# Patient Record
Sex: MALE | HISPANIC OR LATINO | Employment: FULL TIME | URBAN - METROPOLITAN AREA
[De-identification: names, ages, dates, MRNs, and addresses within clinical notes are randomized per-mention and may not be internally consistent; named-entity substitution may affect disease eponyms.]

---

## 2024-05-20 ENCOUNTER — OFFICE VISIT (OUTPATIENT)
Dept: MEDICAL GROUP | Facility: MEDICAL CENTER | Age: 54
End: 2024-05-20
Payer: COMMERCIAL

## 2024-05-20 ENCOUNTER — HOSPITAL ENCOUNTER (OUTPATIENT)
Dept: RADIOLOGY | Facility: MEDICAL CENTER | Age: 54
End: 2024-05-20
Attending: FAMILY MEDICINE
Payer: COMMERCIAL

## 2024-05-20 ENCOUNTER — PHARMACY VISIT (OUTPATIENT)
Dept: PHARMACY | Facility: MEDICAL CENTER | Age: 54
End: 2024-05-20
Payer: COMMERCIAL

## 2024-05-20 VITALS
TEMPERATURE: 98.3 F | HEIGHT: 70 IN | OXYGEN SATURATION: 95 % | BODY MASS INDEX: 29.49 KG/M2 | WEIGHT: 206 LBS | SYSTOLIC BLOOD PRESSURE: 130 MMHG | DIASTOLIC BLOOD PRESSURE: 80 MMHG | HEART RATE: 107 BPM | RESPIRATION RATE: 16 BRPM

## 2024-05-20 DIAGNOSIS — Z11.59 NEED FOR HEPATITIS C SCREENING TEST: ICD-10-CM

## 2024-05-20 DIAGNOSIS — E78.5 HYPERLIPIDEMIA ASSOCIATED WITH TYPE 2 DIABETES MELLITUS (HCC): ICD-10-CM

## 2024-05-20 DIAGNOSIS — E11.69 HYPERLIPIDEMIA ASSOCIATED WITH TYPE 2 DIABETES MELLITUS (HCC): ICD-10-CM

## 2024-05-20 DIAGNOSIS — M79.641 RIGHT HAND PAIN: ICD-10-CM

## 2024-05-20 DIAGNOSIS — Z79.4 TYPE 2 DIABETES MELLITUS WITH HYPERGLYCEMIA, WITH LONG-TERM CURRENT USE OF INSULIN (HCC): ICD-10-CM

## 2024-05-20 DIAGNOSIS — Z23 NEED FOR VACCINATION: ICD-10-CM

## 2024-05-20 DIAGNOSIS — Z86.73 HISTORY OF STROKE: ICD-10-CM

## 2024-05-20 DIAGNOSIS — Z11.4 ENCOUNTER FOR SCREENING FOR HIV: ICD-10-CM

## 2024-05-20 DIAGNOSIS — Z12.5 PROSTATE CANCER SCREENING: ICD-10-CM

## 2024-05-20 DIAGNOSIS — E66.3 OVERWEIGHT (BMI 25.0-29.9): ICD-10-CM

## 2024-05-20 DIAGNOSIS — E11.65 TYPE 2 DIABETES MELLITUS WITH HYPERGLYCEMIA, WITH LONG-TERM CURRENT USE OF INSULIN (HCC): ICD-10-CM

## 2024-05-20 DIAGNOSIS — E11.59 HYPERTENSION ASSOCIATED WITH TYPE 2 DIABETES MELLITUS (HCC): ICD-10-CM

## 2024-05-20 DIAGNOSIS — Z12.11 COLON CANCER SCREENING: ICD-10-CM

## 2024-05-20 DIAGNOSIS — I15.2 HYPERTENSION ASSOCIATED WITH TYPE 2 DIABETES MELLITUS (HCC): ICD-10-CM

## 2024-05-20 PROCEDURE — 3075F SYST BP GE 130 - 139MM HG: CPT | Performed by: FAMILY MEDICINE

## 2024-05-20 PROCEDURE — 90746 HEPB VACCINE 3 DOSE ADULT IM: CPT | Performed by: FAMILY MEDICINE

## 2024-05-20 PROCEDURE — 99204 OFFICE O/P NEW MOD 45 MIN: CPT | Mod: 25 | Performed by: FAMILY MEDICINE

## 2024-05-20 PROCEDURE — 90471 IMMUNIZATION ADMIN: CPT | Performed by: FAMILY MEDICINE

## 2024-05-20 PROCEDURE — RXMED WILLOW AMBULATORY MEDICATION CHARGE: Performed by: FAMILY MEDICINE

## 2024-05-20 PROCEDURE — 3079F DIAST BP 80-89 MM HG: CPT | Performed by: FAMILY MEDICINE

## 2024-05-20 RX ORDER — EMPAGLIFLOZIN 25 MG/1
TABLET, FILM COATED ORAL
COMMUNITY
End: 2024-05-20 | Stop reason: SDUPTHER

## 2024-05-20 RX ORDER — FENOFIBRATE 145 MG/1
145 TABLET, COATED ORAL DAILY
COMMUNITY
End: 2024-05-20 | Stop reason: SDUPTHER

## 2024-05-20 RX ORDER — INSULIN GLARGINE 100 [IU]/ML
INJECTION, SOLUTION SUBCUTANEOUS
Qty: 45 ML | Refills: 3 | Status: SHIPPED | OUTPATIENT
Start: 2024-05-20

## 2024-05-20 RX ORDER — ATORVASTATIN CALCIUM 20 MG/1
20 TABLET, FILM COATED ORAL NIGHTLY
COMMUNITY
End: 2024-05-20 | Stop reason: SDUPTHER

## 2024-05-20 RX ORDER — SEMAGLUTIDE 1.34 MG/ML
INJECTION, SOLUTION SUBCUTANEOUS
COMMUNITY
End: 2024-05-20 | Stop reason: SDUPTHER

## 2024-05-20 RX ORDER — PEN NEEDLE, DIABETIC 31 GX5/16"
NEEDLE, DISPOSABLE MISCELLANEOUS
COMMUNITY
Start: 2024-03-25

## 2024-05-20 RX ORDER — EMPAGLIFLOZIN 25 MG/1
25 TABLET, FILM COATED ORAL EVERY MORNING
Qty: 90 TABLET | Refills: 3 | Status: SHIPPED | OUTPATIENT
Start: 2024-05-20

## 2024-05-20 RX ORDER — CLOPIDOGREL BISULFATE 75 MG/1
75 TABLET ORAL DAILY
COMMUNITY
End: 2024-05-20 | Stop reason: SDUPTHER

## 2024-05-20 RX ORDER — AMLODIPINE BESYLATE 5 MG/1
10 TABLET ORAL DAILY
Qty: 30 TABLET | Status: CANCELLED | OUTPATIENT
Start: 2024-05-20

## 2024-05-20 RX ORDER — ATORVASTATIN CALCIUM 20 MG/1
20 TABLET, FILM COATED ORAL NIGHTLY
Qty: 30 TABLET | Status: CANCELLED | OUTPATIENT
Start: 2024-05-20

## 2024-05-20 RX ORDER — FENOFIBRATE 145 MG/1
145 TABLET, COATED ORAL EVERY MORNING
Qty: 90 TABLET | Refills: 3 | Status: SHIPPED | OUTPATIENT
Start: 2024-05-20

## 2024-05-20 RX ORDER — ATORVASTATIN CALCIUM 20 MG/1
20 TABLET, FILM COATED ORAL NIGHTLY
Qty: 90 TABLET | Refills: 3 | Status: SHIPPED | OUTPATIENT
Start: 2024-05-20

## 2024-05-20 RX ORDER — LISINOPRIL 40 MG/1
40 TABLET ORAL
Qty: 90 TABLET | Refills: 3 | Status: SHIPPED | OUTPATIENT
Start: 2024-05-20

## 2024-05-20 RX ORDER — INSULIN GLARGINE 100 [IU]/ML
INJECTION, SOLUTION SUBCUTANEOUS
COMMUNITY
Start: 2024-03-25 | End: 2024-05-20 | Stop reason: SDUPTHER

## 2024-05-20 RX ORDER — LISINOPRIL 20 MG/1
60 TABLET ORAL DAILY
COMMUNITY
End: 2024-05-20 | Stop reason: SDUPTHER

## 2024-05-20 RX ORDER — AMLODIPINE BESYLATE 5 MG/1
10 TABLET ORAL DAILY
COMMUNITY
End: 2024-05-20

## 2024-05-20 RX ORDER — AMLODIPINE BESYLATE 10 MG/1
10 TABLET ORAL
Qty: 90 TABLET | Refills: 3 | Status: SHIPPED | OUTPATIENT
Start: 2024-05-20

## 2024-05-20 RX ORDER — CLOPIDOGREL BISULFATE 75 MG/1
75 TABLET ORAL DAILY
Qty: 90 TABLET | Refills: 3 | Status: SHIPPED | OUTPATIENT
Start: 2024-05-20

## 2024-05-20 RX ORDER — SEMAGLUTIDE 1.34 MG/ML
1 INJECTION, SOLUTION SUBCUTANEOUS
Qty: 9 ML | Refills: 3 | Status: SHIPPED | OUTPATIENT
Start: 2024-05-20

## 2024-05-20 ASSESSMENT — PATIENT HEALTH QUESTIONNAIRE - PHQ9: CLINICAL INTERPRETATION OF PHQ2 SCORE: 0

## 2024-05-20 NOTE — LETTER
[employer]  Luis Antonio Cazares D.O.  [address]  [place] [address]  Fax: [phone]   Authorization for Release/Disclosure of   Protected Health Information   Name: JS CRUZ : 1970 SSN: [id number]   Address: [address] [place] [address]  [place] [address] Phone:    [phone] (home)    I authorize the entity listed below to release/disclose the PHI below to:   [employer]/Luis Antonio Cazares D.O. and Luis Antonio Cazares D.O.   Provider or Entity Name:  [facility]   Address   City, [facility], [facility]   Phone:  [phone]    Fax:  [phone]   Reason for request: continuity of care   Information to be released:    [  ] LAST COLONOSCOPY,  including any PATH REPORT and follow-up  [  ] LAST FIT/COLOGUARD RESULT [  ] LAST DEXA  [  ] LAST MAMMOGRAM  [  ] LAST PAP  [  ] LAST LABS [xxxxxxx  ] RETINA EXAM REPORT  [  ] IMMUNIZATION RECORDS  [  ] Release all info      [  ] Check here and initial the line next to each item to release ALL health information INCLUDING  _____ Care and treatment for drug and / or alcohol abuse  _____ HIV testing, infection status, or AIDS  _____ Genetic Testing    DATES OF SERVICE OR TIME PERIOD TO BE DISCLOSED: _____________  I understand and acknowledge that:  * This Authorization may be revoked at any time by you in writing, except if your health information has already been used or disclosed.  * Your health information that will be used or disclosed as a result of you signing this authorization could be re-disclosed by the recipient. If this occurs, your re-disclosed health information may no longer be protected by State or Federal laws.  * You may refuse to sign this Authorization. Your refusal will not affect your ability to obtain treatment.  * This Authorization becomes effective upon signing and will  on (date) __________.      If no date is indicated, this Authorization will  one (1) year from the signature date.    Name: Js HOLCOMB  Apolinar  Signature: Date:   5/20/2024     PLEASE FAX REQUESTED RECORDS BACK TO: [phone]

## 2024-05-20 NOTE — PROGRESS NOTES
Verbal consent was acquired by the patient to use [employer] ambient listening note generation during this visit    Subjective:     CC:  Diagnoses of Right hand pain, Type 2 diabetes mellitus with hyperglycemia, with long-term current use of insulin (HCC), Hypertension associated with type 2 diabetes mellitus (HCC), Hyperlipidemia associated with type 2 diabetes mellitus (HCC), History of stroke, Overweight (BMI 25.0-29.9), Need for vaccination, Colon cancer screening, Prostate cancer screening, Need for hepatitis C screening test, and Encounter for screening for HIV were pertinent to this visit.    HISTORY OF THE PRESENT ILLNESS: Patient is a 54 y.o. male. This pleasant patient is here today to establish care and discuss diabetes, right hand pain.  He was previously with [facility].    History of Present Illness  The patient is a 54-year-old male here to establish care and get our medications refilled among many things.    The patient has a history of multiple strokes, with the first occurring in 2016. Following the stroke, he was hospitalized for a duration of 3 years. In either 12/2023 or 01/2024, while engaged in heavy-duty work, he sustained a wound on his right hand. Around 02/2024 or 03/2024, he began to experience abnormal hand function, prompting him to learn hand practice. He reports pain, stiffness, and a sensation of his pinky finger popping open. He also experiences tingling and numbness, and a lack of hand control, necessitating the use of a glove.    The patient's blood glucose levels have been well-managed with Ozempic, although he does not frequently monitor his weight. He utilizes 34 units of Lantus in the morning and 40 units at night. He has not been able to monitor his blood glucose levels due to difficulties with his finger pricks. His last eye examination was approximately 2 months ago, during which he was informed that his left eye was not significantly affected.    The  patient has never undergone a colonoscopy or stool test. He was referred to an ophthalmologist at [facility], but has yet to schedule an appointment. He reports difficulty with distance vision and requests a referral to a new ophthalmologist.    Supplemental Information  About 18 years ago, he was in the hospital because of too much drinking and that is why he became a diabetic. He lost his eyes for about 2 weeks of it until he got his glasses again.   He has not smoked since 2016. He started smoking back and forth since he was about 15 or 16 years old. He used to smoke a pack a day for a whole week. He tried to chew tobacco when he was in a foster home. He does not drink alcohol. He smokes marijuana to help him sleep. He is not sexually active.   He has a family history of stroke.    Current Outpatient Medications Ordered in Epic   Medication Sig Dispense Refill    TECHLITE PEN NEEDLES       Continuous Glucose  (FREESTYLE DARVIN 2 READER) Device       clopidogrel (PLAVIX) 75 MG Tab Take 1 Tablet by mouth every day. 90 Tablet 3    Empagliflozin (JARDIANCE) 25 MG Tab Take 1 tablet (25 mg) by mouth every morning. 90 Tablet 3    fenofibrate (TRICOR) 145 MG Tab Take 1 Tablet by mouth every morning. 90 Tablet 3    LANTUS SOLOSTAR 100 UNIT/ML Solution Pen-injector injection Take 34 units in the morning and 40 units in the evening, subcutaneously 45 mL 3    lisinopril (PRINIVIL) 40 MG tablet Take 1 Tablet by mouth at bedtime. 90 Tablet 3    metformin (GLUCOPHAGE) 1000 MG tablet Take 1 Tablet by mouth 2 times a day with meals. 180 Tablet 3    metoprolol tartrate (LOPRESSOR) 25 MG Tab Take 1 Tablet by mouth 2 times a day. 180 Tablet 3    Semaglutide, 1 MG/DOSE, (OZEMPIC, 1 MG/DOSE,) 4 MG/3ML Solution Pen-injector Inject 1 mg under the skin every 7 days. 9 mL 3    amLODIPine (NORVASC) 10 MG Tab Take 1 Tablet by mouth at bedtime. 90 Tablet 3    atorvastatin (LIPITOR) 20 MG Tab Take 1 Tablet by mouth every  "evening. 90 Tablet 3     No current Epic-ordered facility-administered medications on file.       Not on File    History reviewed. No pertinent past medical history.    Past Surgical History:   Procedure Laterality Date    ENDOSCOPY         Family History   Problem Relation Age of Onset    Heart Disease Other     Stroke Other        Social History     Socioeconomic History    Marital status:    Tobacco Use    Smoking status: Former     Current packs/day: 0.00     Average packs/day: 0.1 packs/day for 31.0 years (3.1 ttl pk-yrs)     Types: Cigarettes     Start date:      Quit date: 2016     Years since quittin.3    Smokeless tobacco: Former     Types: Chew   Vaping Use    Vaping status: Never Used   Substance and Sexual Activity    Alcohol use: Not Currently    Drug use: Yes     Types: Marijuana     Comment: daily    Sexual activity: Not Currently       Health Maintenance: Completed    ROS:   ROS see HPI      Objective:       Exam: /80   Pulse (!) 107   Temp 36.8 °C (98.3 °F) (Temporal)   Resp 16   Ht 1.778 m (5' 10\")   Wt 93.4 kg (206 lb)   SpO2 95%  Body mass index is 29.56 kg/m².    Physical Exam  Vitals reviewed.   Constitutional:       General: He is not in acute distress.     Appearance: Normal appearance.   HENT:      Head: Normocephalic and atraumatic.   Cardiovascular:      Rate and Rhythm: Normal rate and regular rhythm.      Heart sounds: Normal heart sounds.   Pulmonary:      Effort: Pulmonary effort is normal. No respiratory distress.      Breath sounds: Normal breath sounds.   Musculoskeletal:         General: No swelling, tenderness or deformity. Normal range of motion.   Skin:     General: Skin is warm and dry.   Neurological:      Mental Status: He is alert. Mental status is at baseline.      Gait: Gait normal.   Psychiatric:         Mood and Affect: Mood normal.         Behavior: Behavior normal.             Assessment & Plan:   54 y.o. male with the following -    1. " Right hand pain  Newer problem ongoing for the last few months.  Patient reports he had a superficial wound that for some reason he put lemon juice in it.  The skin has healed.  He continues to have pain and a strange feeling in the ulnar portion of his palmar hand and into the fourth and fifth digits.  I do not know if this is related to stated history.  There does seem to be some mild atrophy of the hypothenar eminence.  No discoloration or other deformity.  Will get a right hand x-ray and see if we are more time to discuss at next appointment.  - DX-HAND 3+ RIGHT; Future    2. Type 2 diabetes mellitus with hyperglycemia, with long-term current use of insulin (HCC)  Chronic uncertain on stability as patient does not have a sugar log or recent labs for me to assess.  He denies hypoglycemia.  It seems that his regimen is fairly rigorously and he does seem to know it well.  Will continue with medications as listed below.  - Empagliflozin (JARDIANCE) 25 MG Tab; Take 1 tablet (25 mg) by mouth every morning.  Dispense: 90 Tablet; Refill: 3  - LANTUS SOLOSTAR 100 UNIT/ML Solution Pen-injector injection; Take 34 units in the morning and 40 units in the evening, subcutaneously  Dispense: 45 mL; Refill: 3  - metformin (GLUCOPHAGE) 1000 MG tablet; Take 1 Tablet by mouth 2 times a day with meals.  Dispense: 180 Tablet; Refill: 3  - metoprolol tartrate (LOPRESSOR) 25 MG Tab; Take 1 Tablet by mouth 2 times a day.  Dispense: 180 Tablet; Refill: 3  - Semaglutide, 1 MG/DOSE, (OZEMPIC, 1 MG/DOSE,) 4 MG/3ML Solution Pen-injector; Inject 1 mg under the skin every 7 days.  Dispense: 9 mL; Refill: 3  - atorvastatin (LIPITOR) 20 MG Tab; Take 1 Tablet by mouth every evening.  Dispense: 90 Tablet; Refill: 3  - CBC WITHOUT DIFFERENTIAL; Future  - Comp Metabolic Panel; Future  - Lipid Profile; Future  - MICROALBUMIN CREAT RATIO URINE; Future  - VITAMIN B12; Future  - TSH WITH REFLEX TO FT4; Future    3. Hypertension associated with type 2  diabetes mellitus (HCC)  This is a chronic condition, controlled.  Blood pressure is at goal under 140/90 in the office today.  We will continue the current regimen.  - lisinopril (PRINIVIL) 40 MG tablet; Take 1 Tablet by mouth at bedtime.  Dispense: 90 Tablet; Refill: 3  - metoprolol tartrate (LOPRESSOR) 25 MG Tab; Take 1 Tablet by mouth 2 times a day.  Dispense: 180 Tablet; Refill: 3  - amLODIPine (NORVASC) 10 MG Tab; Take 1 Tablet by mouth at bedtime.  Dispense: 90 Tablet; Refill: 3  - Comp Metabolic Panel; Future  - MICROALBUMIN CREAT RATIO URINE; Future    4. Hyperlipidemia associated with type 2 diabetes mellitus (HCC)  Chronic and presumed stable.  Patient does have a history of stroke.  Will continue on Plavix, Tricor, Lipitor as listed.  - clopidogrel (PLAVIX) 75 MG Tab; Take 1 Tablet by mouth every day.  Dispense: 90 Tablet; Refill: 3  - fenofibrate (TRICOR) 145 MG Tab; Take 1 Tablet by mouth every morning.  Dispense: 90 Tablet; Refill: 3  - atorvastatin (LIPITOR) 20 MG Tab; Take 1 Tablet by mouth every evening.  Dispense: 90 Tablet; Refill: 3  - Comp Metabolic Panel; Future  - Lipid Profile; Future    5. History of stroke  - clopidogrel (PLAVIX) 75 MG Tab; Take 1 Tablet by mouth every day.  Dispense: 90 Tablet; Refill: 3  - atorvastatin (LIPITOR) 20 MG Tab; Take 1 Tablet by mouth every evening.  Dispense: 90 Tablet; Refill: 3  - CBC WITHOUT DIFFERENTIAL; Future  - Comp Metabolic Panel; Future  - Lipid Profile; Future    6. Overweight (BMI 25.0-29.9)  - CBC WITHOUT DIFFERENTIAL; Future  - Comp Metabolic Panel; Future  - Lipid Profile; Future  - TSH WITH REFLEX TO FT4; Future    7. Need for vaccination  - Hepatitis B Vaccine Adult 20+    8. Colon cancer screening  - Referral to GI for Colonoscopy    9. Prostate cancer screening  - PROSTATE SPECIFIC AG SCREENING; Future    10. Need for hepatitis C screening test  - HEP C VIRUS ANTIBODY; Future    11. Encounter for screening for HIV  - HIV AG/AB COMBO ASSAY  SCREENING; Future        Return in about 4 weeks (around 6/17/2024), or if symptoms worsen or fail to improve, for Diabetes F/U, Medication F/U.    Please note that this dictation was created using voice recognition software. I have made every reasonable attempt to correct obvious errors, but I expect that there are errors of grammar and possibly content that I did not discover before finalizing the note.

## 2024-05-22 ENCOUNTER — TELEPHONE (OUTPATIENT)
Dept: MEDICAL GROUP | Facility: MEDICAL CENTER | Age: 54
End: 2024-05-22
Payer: COMMERCIAL

## 2024-05-22 DIAGNOSIS — E11.65 TYPE 2 DIABETES MELLITUS WITH HYPERGLYCEMIA, WITH LONG-TERM CURRENT USE OF INSULIN (HCC): Chronic | ICD-10-CM

## 2024-05-22 DIAGNOSIS — Z79.4 TYPE 2 DIABETES MELLITUS WITH HYPERGLYCEMIA, WITH LONG-TERM CURRENT USE OF INSULIN (HCC): Chronic | ICD-10-CM

## 2024-05-24 ENCOUNTER — HOSPITAL ENCOUNTER (OUTPATIENT)
Dept: LAB | Facility: MEDICAL CENTER | Age: 54
End: 2024-05-24
Attending: FAMILY MEDICINE
Payer: COMMERCIAL

## 2024-05-24 DIAGNOSIS — E11.59 HYPERTENSION ASSOCIATED WITH TYPE 2 DIABETES MELLITUS (HCC): ICD-10-CM

## 2024-05-24 DIAGNOSIS — E78.5 HYPERLIPIDEMIA ASSOCIATED WITH TYPE 2 DIABETES MELLITUS (HCC): ICD-10-CM

## 2024-05-24 DIAGNOSIS — Z12.5 PROSTATE CANCER SCREENING: ICD-10-CM

## 2024-05-24 DIAGNOSIS — Z86.73 HISTORY OF STROKE: ICD-10-CM

## 2024-05-24 DIAGNOSIS — I15.2 HYPERTENSION ASSOCIATED WITH TYPE 2 DIABETES MELLITUS (HCC): ICD-10-CM

## 2024-05-24 DIAGNOSIS — E11.69 HYPERLIPIDEMIA ASSOCIATED WITH TYPE 2 DIABETES MELLITUS (HCC): ICD-10-CM

## 2024-05-24 DIAGNOSIS — Z11.59 NEED FOR HEPATITIS C SCREENING TEST: ICD-10-CM

## 2024-05-24 DIAGNOSIS — E11.65 TYPE 2 DIABETES MELLITUS WITH HYPERGLYCEMIA, WITH LONG-TERM CURRENT USE OF INSULIN (HCC): Chronic | ICD-10-CM

## 2024-05-24 DIAGNOSIS — E66.3 OVERWEIGHT (BMI 25.0-29.9): ICD-10-CM

## 2024-05-24 DIAGNOSIS — Z11.4 ENCOUNTER FOR SCREENING FOR HIV: ICD-10-CM

## 2024-05-24 DIAGNOSIS — Z79.4 TYPE 2 DIABETES MELLITUS WITH HYPERGLYCEMIA, WITH LONG-TERM CURRENT USE OF INSULIN (HCC): Chronic | ICD-10-CM

## 2024-05-24 LAB
ALBUMIN SERPL BCP-MCNC: 4 G/DL (ref 3.2–4.9)
ALBUMIN/GLOB SERPL: 1.2 G/DL
ALP SERPL-CCNC: 99 U/L (ref 30–99)
ALT SERPL-CCNC: 29 U/L (ref 2–50)
ANION GAP SERPL CALC-SCNC: 14 MMOL/L (ref 7–16)
AST SERPL-CCNC: 22 U/L (ref 12–45)
BILIRUB SERPL-MCNC: 0.6 MG/DL (ref 0.1–1.5)
BUN SERPL-MCNC: 18 MG/DL (ref 8–22)
CALCIUM ALBUM COR SERPL-MCNC: 9.3 MG/DL (ref 8.5–10.5)
CALCIUM SERPL-MCNC: 9.3 MG/DL (ref 8.5–10.5)
CHLORIDE SERPL-SCNC: 101 MMOL/L (ref 96–112)
CHOLEST SERPL-MCNC: 437 MG/DL (ref 100–199)
CO2 SERPL-SCNC: 22 MMOL/L (ref 20–33)
CREAT SERPL-MCNC: 0.85 MG/DL (ref 0.5–1.4)
ERYTHROCYTE [DISTWIDTH] IN BLOOD BY AUTOMATED COUNT: 38.4 FL (ref 35.9–50)
EST. AVERAGE GLUCOSE BLD GHB EST-MCNC: 269 MG/DL
GFR SERPLBLD CREATININE-BSD FMLA CKD-EPI: 104 ML/MIN/1.73 M 2
GLOBULIN SER CALC-MCNC: 3.3 G/DL (ref 1.9–3.5)
GLUCOSE SERPL-MCNC: 136 MG/DL (ref 65–99)
HBA1C MFR BLD: 11 % (ref 4–5.6)
HCT VFR BLD AUTO: 47.6 % (ref 42–52)
HCV AB SER QL: REACTIVE
HDLC SERPL-MCNC: 33 MG/DL
HGB BLD-MCNC: 17 G/DL (ref 14–18)
HIV 1+2 AB+HIV1 P24 AG SERPL QL IA: NORMAL
LDLC SERPL CALC-MCNC: ABNORMAL MG/DL
MCH RBC QN AUTO: 30.2 PG (ref 27–33)
MCHC RBC AUTO-ENTMCNC: 35.7 G/DL (ref 32.3–36.5)
MCV RBC AUTO: 84.7 FL (ref 81.4–97.8)
PLATELET # BLD AUTO: 249 K/UL (ref 164–446)
PMV BLD AUTO: 9.5 FL (ref 9–12.9)
POTASSIUM SERPL-SCNC: 3.9 MMOL/L (ref 3.6–5.5)
PROT SERPL-MCNC: 7.3 G/DL (ref 6–8.2)
PSA SERPL-MCNC: 0.26 NG/ML (ref 0–4)
RBC # BLD AUTO: 5.62 M/UL (ref 4.7–6.1)
SODIUM SERPL-SCNC: 137 MMOL/L (ref 135–145)
TRIGL SERPL-MCNC: 802 MG/DL (ref 0–149)
TSH SERPL DL<=0.005 MIU/L-ACNC: 1.22 UIU/ML (ref 0.38–5.33)
VIT B12 SERPL-MCNC: 626 PG/ML (ref 211–911)
WBC # BLD AUTO: 8.5 K/UL (ref 4.8–10.8)

## 2024-05-25 LAB
CREAT UR-MCNC: 178.12 MG/DL
MICROALBUMIN UR-MCNC: 3.4 MG/DL
MICROALBUMIN/CREAT UR: 19 MG/G (ref 0–30)

## 2024-05-28 LAB
HCV RNA SERPL NAA+PROBE-ACNC: NOT DETECTED IU/ML
HCV RNA SERPL NAA+PROBE-LOG IU: NOT DETECTED LOG IU/ML
HCV RNA SERPL QL NAA+PROBE: NOT DETECTED

## 2024-06-04 ENCOUNTER — TELEPHONE (OUTPATIENT)
Dept: MEDICAL GROUP | Facility: MEDICAL CENTER | Age: 54
End: 2024-06-04
Payer: COMMERCIAL

## 2024-06-04 NOTE — TELEPHONE ENCOUNTER
FINAL PRIOR AUTHORIZATION STATUS:    1.  Name of Medication & Dose: Semaglutide, 1 MG/DOSE, (OZEMPIC, 1 MG/DOSE,) 4 MG/3ML Solution Pen-injector      2. Prior Auth Status: Approved through til 6/3/25     3. Action Taken: Pharmacy Notified: yes Patient Notified: yes

## 2024-06-18 ENCOUNTER — OFFICE VISIT (OUTPATIENT)
Dept: MEDICAL GROUP | Facility: MEDICAL CENTER | Age: 54
End: 2024-06-18
Payer: COMMERCIAL

## 2024-06-18 ENCOUNTER — PHARMACY VISIT (OUTPATIENT)
Dept: PHARMACY | Facility: MEDICAL CENTER | Age: 54
End: 2024-06-18
Payer: COMMERCIAL

## 2024-06-18 VITALS
WEIGHT: 211 LBS | DIASTOLIC BLOOD PRESSURE: 66 MMHG | SYSTOLIC BLOOD PRESSURE: 126 MMHG | BODY MASS INDEX: 30.21 KG/M2 | OXYGEN SATURATION: 95 % | HEIGHT: 70 IN | TEMPERATURE: 97 F | HEART RATE: 110 BPM

## 2024-06-18 DIAGNOSIS — Z86.73 HISTORY OF STROKE: ICD-10-CM

## 2024-06-18 DIAGNOSIS — I15.2 HYPERTENSION ASSOCIATED WITH TYPE 2 DIABETES MELLITUS (HCC): Chronic | ICD-10-CM

## 2024-06-18 DIAGNOSIS — E11.69 HYPERLIPIDEMIA ASSOCIATED WITH TYPE 2 DIABETES MELLITUS (HCC): ICD-10-CM

## 2024-06-18 DIAGNOSIS — Z23 NEED FOR VACCINATION: ICD-10-CM

## 2024-06-18 DIAGNOSIS — E66.09 CLASS 1 OBESITY DUE TO EXCESS CALORIES WITH SERIOUS COMORBIDITY AND BODY MASS INDEX (BMI) OF 30.0 TO 30.9 IN ADULT: ICD-10-CM

## 2024-06-18 DIAGNOSIS — E78.5 HYPERLIPIDEMIA ASSOCIATED WITH TYPE 2 DIABETES MELLITUS (HCC): ICD-10-CM

## 2024-06-18 DIAGNOSIS — E11.65 UNCONTROLLED TYPE 2 DIABETES MELLITUS WITH HYPERGLYCEMIA (HCC): ICD-10-CM

## 2024-06-18 DIAGNOSIS — E11.59 HYPERTENSION ASSOCIATED WITH TYPE 2 DIABETES MELLITUS (HCC): Chronic | ICD-10-CM

## 2024-06-18 PROBLEM — E66.811 CLASS 1 OBESITY DUE TO EXCESS CALORIES WITH SERIOUS COMORBIDITY AND BODY MASS INDEX (BMI) OF 30.0 TO 30.9 IN ADULT: Status: ACTIVE | Noted: 2024-05-20

## 2024-06-18 PROBLEM — Z91.199 NONCOMPLIANCE: Status: ACTIVE | Noted: 2024-06-18

## 2024-06-18 PROBLEM — I69.319 RESIDUAL COGNITIVE DEFICIT AS LATE EFFECT OF CEREBROVASCULAR ACCIDENT: Status: ACTIVE | Noted: 2024-06-18

## 2024-06-18 PROBLEM — Z87.19 HISTORY OF PANCREATITIS: Status: RESOLVED | Noted: 2024-06-18 | Resolved: 2024-06-18

## 2024-06-18 LAB — GLUCOSE BLD-MCNC: 365 MG/DL (ref 65–99)

## 2024-06-18 PROCEDURE — 3078F DIAST BP <80 MM HG: CPT | Performed by: FAMILY MEDICINE

## 2024-06-18 PROCEDURE — 90472 IMMUNIZATION ADMIN EACH ADD: CPT | Performed by: FAMILY MEDICINE

## 2024-06-18 PROCEDURE — 3074F SYST BP LT 130 MM HG: CPT | Performed by: FAMILY MEDICINE

## 2024-06-18 PROCEDURE — 90746 HEPB VACCINE 3 DOSE ADULT IM: CPT | Performed by: FAMILY MEDICINE

## 2024-06-18 PROCEDURE — 82962 GLUCOSE BLOOD TEST: CPT | Performed by: FAMILY MEDICINE

## 2024-06-18 PROCEDURE — 99214 OFFICE O/P EST MOD 30 MIN: CPT | Mod: 25 | Performed by: FAMILY MEDICINE

## 2024-06-18 PROCEDURE — 90677 PCV20 VACCINE IM: CPT | Performed by: FAMILY MEDICINE

## 2024-06-18 PROCEDURE — 90471 IMMUNIZATION ADMIN: CPT | Performed by: FAMILY MEDICINE

## 2024-06-18 PROCEDURE — RXMED WILLOW AMBULATORY MEDICATION CHARGE: Performed by: FAMILY MEDICINE

## 2024-06-18 RX ORDER — FENOFIBRATE 145 MG/1
TABLET, COATED ORAL
COMMUNITY
End: 2024-06-18

## 2024-06-18 RX ORDER — ATORVASTATIN CALCIUM 40 MG/1
40 TABLET, FILM COATED ORAL NIGHTLY
Qty: 30 TABLET | Refills: 11 | Status: SHIPPED | OUTPATIENT
Start: 2024-06-18 | End: 2024-06-18 | Stop reason: SDUPTHER

## 2024-06-18 RX ORDER — INSULIN GLARGINE 100 [IU]/ML
INJECTION, SOLUTION SUBCUTANEOUS
Qty: 45 ML | Refills: 3 | Status: SHIPPED | OUTPATIENT
Start: 2024-06-18

## 2024-06-18 RX ORDER — ATORVASTATIN CALCIUM 40 MG/1
40 TABLET, FILM COATED ORAL NIGHTLY
Qty: 90 TABLET | Refills: 3 | Status: SHIPPED | OUTPATIENT
Start: 2024-06-18

## 2024-06-18 RX ORDER — AMLODIPINE BESYLATE 5 MG/1
TABLET ORAL
COMMUNITY
End: 2024-06-18

## 2024-06-18 ASSESSMENT — FIBROSIS 4 INDEX: FIB4 SCORE: 0.87

## 2024-06-18 NOTE — PATIENT INSTRUCTIONS
Diabetes  Continue Jardiance (empagliflozin) 25 mg every morning  Metfromin continue 1,000 mg twice a day with food  Increase Semaglutide (ozempic) to 2 mg injection once a week  Restart Lantus Insulin 20 units nightly    Cholesterol  Increase Atorvastatin to 40 mg nightly  Continue Fenofibrate 145 mg daily    Blood pressure  Continue Lisinopril 40 mg and Amlodipine 10 mg nightly  Continue Metoprolol 25 mg twice a day

## 2024-06-18 NOTE — LETTER
[facility]  Luis Antonio Cazares D.O.  [address]  [place] [address]  Fax: [phone]   Authorization for Release/Disclosure of   Protected Health Information   Name: JS CRUZ : 1970 SSN: [id number]   Address: [address] [place] [address]  [place] [address] Phone:    [phone] (home)    I authorize the entity listed below to release/disclose the PHI below to:   [facility]/Luis Antonio Cazares D.O. and Luis Antonio Cazares D.O.   Provider or Entity Name:  [facility] 15   Address   City, State, [facility]   Phone:  [phone]    Fax:  [phone]   Reason for request: continuity of care   Information to be released:    [  ] LAST COLONOSCOPY,  including any PATH REPORT and follow-up  [  ] LAST FIT/COLOGUARD RESULT [  ] LAST DEXA  [  ] LAST MAMMOGRAM  [  ] LAST PAP  [  ] LAST LABS [xxxxxx  ] RETINA EXAM REPORT  [  ] IMMUNIZATION RECORDS  [  ] Release all info      [  ] Check here and initial the line next to each item to release ALL health information INCLUDING  _____ Care and treatment for drug and / or alcohol abuse  _____ HIV testing, infection status, or AIDS  _____ Genetic Testing    DATES OF SERVICE OR TIME PERIOD TO BE DISCLOSED: _____________  I understand and acknowledge that:  * This Authorization may be revoked at any time by you in writing, except if your health information has already been used or disclosed.  * Your health information that will be used or disclosed as a result of you signing this authorization could be re-disclosed by the recipient. If this occurs, your re-disclosed health information may no longer be protected by State or Federal laws.  * You may refuse to sign this Authorization. Your refusal will not affect your ability to obtain treatment.  * This Authorization becomes effective upon signing and will  on (date) __________.      If no date is indicated, this Authorization will  one (1) year from the signature date.    Name: Js  Zan Jiang  Signature: Date:   6/18/2024     PLEASE FAX REQUESTED RECORDS BACK TO: [phone]

## 2024-06-18 NOTE — PROGRESS NOTES
"Verbal consent was acquired by the patient to use [employer] ambient listening note generation during this visit    Subjective:     CC: \"Diabetes management\"    History of Present Illness  The patient presents to the office for follow-up of multiple medical concerns.    The patient's dietary habits include nightly consumption of fish, green potatoes, brussels sprouts, oatmeal, milk, and coffee with sweetened cream. He has been adhering to a weekly regimen of Ozempic, which he tolerates well. However, he has discontinued the use of Lantus insulin for approximately one week. His current medication regimen includes Jardiance 25 mg in the morning, metformin 1000 mg twice daily, and amlodipine 10 mg at bedtime for blood pressure management.          Objective:     Exam:  /66   Pulse (!) 110   Temp 36.1 °C (97 °F) (Temporal)   Ht 1.778 m (5' 10\")   Wt 95.7 kg (211 lb)   SpO2 95%   BMI 30.28 kg/m²  Body mass index is 30.28 kg/m².    Physical Exam  Vitals reviewed.   Constitutional:       General: He is not in acute distress.     Appearance: Normal appearance. He is obese. He is not ill-appearing or toxic-appearing.   HENT:      Head: Normocephalic and atraumatic.   Cardiovascular:      Rate and Rhythm: Regular rhythm. Tachycardia present.      Heart sounds: Normal heart sounds.   Pulmonary:      Effort: Pulmonary effort is normal. No respiratory distress.      Breath sounds: Normal breath sounds.   Skin:     General: Skin is warm and dry.   Neurological:      Mental Status: He is alert. Mental status is at baseline.      Gait: Gait normal.   Psychiatric:         Mood and Affect: Mood normal.         Behavior: Behavior normal.             Results  Laboratory Studies  Hepatitis C antibody was positive, no detectable virus detected. Hemoglobin A1c was 11.0. Blood sugar was 365. TSH was 1.2. Vitamin B12 was 626. HIV was negative. Microalbumin was 19. PSA was 0.26. Fasting sugar was 136. Creatinine was 0.85. Liver " enzymes were normal. Hemoglobin was 17.    Imaging  X-ray of the right hand showed no injury, bones appeared normal.      Assessment & Plan:       1. Uncontrolled type 2 diabetes mellitus with hyperglycemia (HCC)  - Diabetic Monofilament LE Exam  - POCT Glucose  - Semaglutide, 2 MG/DOSE, 8 MG/3ML Solution Pen-injector; Inject 2 mg under the skin every 7 days.  Dispense: 3 mL; Refill: 11  - Referral to Pharmacotherapy Service  - LANTUS SOLOSTAR 100 UNIT/ML Solution Pen-injector injection; Take 20 units in the evening, subcutaneously  Dispense: 45 mL; Refill: 3    2. Hypertension associated with type 2 diabetes mellitus (HCC)    3. Hyperlipidemia associated with type 2 diabetes mellitus (HCC)  - atorvastatin (LIPITOR) 40 MG Tab; Take 1 Tablet by mouth every evening.  Dispense: 90 Tablet; Refill: 3    4. History of stroke  - atorvastatin (LIPITOR) 40 MG Tab; Take 1 Tablet by mouth every evening.  Dispense: 90 Tablet; Refill: 3    5. Class 1 obesity due to excess calories with serious comorbidity and body mass index (BMI) of 30.0 to 30.9 in adult  - Semaglutide, 2 MG/DOSE, 8 MG/3ML Solution Pen-injector; Inject 2 mg under the skin every 7 days.  Dispense: 3 mL; Refill: 11    6. Need for vaccination  - Hepatitis B Vaccine Adult 20+  - Pneumococcal Conjugate Vaccine 20-Valent (6 wks+)      Assessment & Plan  1. Diabetes Mellitus.  The patient is advised to persist with the current regimen of Jardiance 25 mg every morning and metformin 1000 mg twice daily with meals. Additionally, the dosage of Ozempic will be increased to 2 mg injection once weekly. The patient will recommence Lantus insulin at a dosage of 20 units nightly.    2. Hypercholesterolemia.  The patient is to continue with the Fenofibrate 145 mg daily. The dosage of atorvastatin will be increased to 40 mg nightly.    3. Hypertension.  The patient's blood pressure is well-managed. The patient is advised to continue with the current regimen of lisinopril 40 mg,  amlodipine 10 mg, and metoprolol 25 mg twice daily.    Follow-up  The patient is scheduled for a follow-up visit in 3 months.         Return in about 3 months (around 9/18/2024), or if symptoms worsen or fail to improve.      This note was created using voice recognition software (Dragon). The accuracy of the dictation is limited by the abilities of the software. I have reviewed the note prior to signing, however some errors in grammar and context are still possible. If you have any questions related to this note please do not hesitate to contact our office.

## 2024-06-21 ENCOUNTER — TELEPHONE (OUTPATIENT)
Dept: HEALTH INFORMATION MANAGEMENT | Facility: OTHER | Age: 54
End: 2024-06-21
Payer: COMMERCIAL

## 2024-07-10 ENCOUNTER — NON-PROVIDER VISIT (OUTPATIENT)
Dept: VASCULAR LAB | Facility: MEDICAL CENTER | Age: 54
End: 2024-07-10
Attending: INTERNAL MEDICINE
Payer: COMMERCIAL

## 2024-07-10 DIAGNOSIS — Z79.4 TYPE 2 DIABETES MELLITUS WITH HYPERGLYCEMIA, WITH LONG-TERM CURRENT USE OF INSULIN (HCC): Chronic | ICD-10-CM

## 2024-07-10 DIAGNOSIS — E11.65 TYPE 2 DIABETES MELLITUS WITH HYPERGLYCEMIA, WITH LONG-TERM CURRENT USE OF INSULIN (HCC): Chronic | ICD-10-CM

## 2024-07-10 PROCEDURE — 99213 OFFICE O/P EST LOW 20 MIN: CPT

## 2024-07-10 RX ORDER — BLOOD-GLUCOSE SENSOR
1 EACH MISCELLANEOUS
Qty: 2 EACH | Refills: 11 | Status: SHIPPED | OUTPATIENT
Start: 2024-07-10

## 2024-08-09 ENCOUNTER — NON-PROVIDER VISIT (OUTPATIENT)
Dept: VASCULAR LAB | Facility: MEDICAL CENTER | Age: 54
End: 2024-08-09
Attending: INTERNAL MEDICINE
Payer: COMMERCIAL

## 2024-08-09 VITALS — BODY MASS INDEX: 29.41 KG/M2 | WEIGHT: 205 LBS

## 2024-08-09 DIAGNOSIS — E11.65 TYPE 2 DIABETES MELLITUS WITH HYPERGLYCEMIA, WITH LONG-TERM CURRENT USE OF INSULIN (HCC): Chronic | ICD-10-CM

## 2024-08-09 DIAGNOSIS — Z79.4 TYPE 2 DIABETES MELLITUS WITH HYPERGLYCEMIA, WITH LONG-TERM CURRENT USE OF INSULIN (HCC): Chronic | ICD-10-CM

## 2024-08-09 PROCEDURE — 99212 OFFICE O/P EST SF 10 MIN: CPT

## 2024-08-09 ASSESSMENT — FIBROSIS 4 INDEX: FIB4 SCORE: 0.87

## 2024-08-09 NOTE — PROGRESS NOTES
Patient Consult Note    Primary care physician: Luis Antonio Cazares D.O.    Reason for consult: Management of Uncontrolled Type 2 Diabetes    HPI:  Js Jiang is a 54 y.o. old patient who comes in today for evaluation of above stated problem.    Allergies  Patient has no allergy information on record.    Current Diabetes Medication Regimen  Metformin IR: 1000 mg BID  GLP-1 or GLP-1/GIP Analog: semaglutide (Ozempic) 2 mg weekly  SGLT-2 Inhibitor: empagliflozin (Jardiance) 25 mg daily    Basal Insulin: Lantus 34 units AM and 40 units PM    Previous Diabetes Medications and Reason for Discontinuation  None noted    Potential Barriers to Care:  Adherence: denies missed doses  Side effects: GERD/vomiting that he attributes to his GLP1 - counseled regarding mitigation techniques  Affordability: No issues noted    SMBG  Pt has home glucometer and proper testing technique - Has not obtained his CGM since last appt. He states he needs to get money from bank prior to obtaining (?). States he plans to obtain today. Has not tested BG since last visit.  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 Plans to start going to the gym.  Exercise Goal - Increase as tolerated. Ensure 150 min of aerobic activity per week.    Dietary -  Breakfast - Oatmeal or cream of wheat  Lunch - [place]  Dinner - [place], salad, chicken  Snacks - Doritos, banana  Dessert - Chocolate candy bar  Drinks - Tea and coffee w/ artificial sweetener, milk, regular soda, water    Preventative Management  BP regimen (ACEi/ARB): Lisinopril 40 mg once daily  Statin: atorvastatin (Lipitor) 40 mg daily  Last Microalbumin/Cr:  Lab Results    Component Value Date/Time    MALBCRT 19 05/24/2024 08:49 AM    MICROALBUR 3.4 05/24/2024 08:49 AM     Last A1c:  Lab Results   Component Value Date/Time    HBA1C 11.0 (H) 05/24/2024 08:50 AM      Last Retinal Scan: 7/15/24    Monofilament exam: Completed 6/2024    Labs  Lab Results   Component Value Date/Time    SODIUM 137 05/24/2024 08:50 AM    POTASSIUM 3.9 05/24/2024 08:50 AM    CHLORIDE 101 05/24/2024 08:50 AM    CO2 22 05/24/2024 08:50 AM    GLUCOSE 136 (H) 05/24/2024 08:50 AM    BUN 18 05/24/2024 08:50 AM    CREATININE 0.85 05/24/2024 08:50 AM     Lab Results   Component Value Date/Time    ALKPHOSPHAT 99 05/24/2024 08:50 AM    ASTSGOT 22 05/24/2024 08:50 AM    ALTSGPT 29 05/24/2024 08:50 AM    TBILIRUBIN 0.6 05/24/2024 08:50 AM    ALBUMIN 4.0 05/24/2024 08:50 AM        Physical Examination:  Vital signs: Wt 93 kg (205 lb)   BMI 29.41 kg/m²  Body mass index is 29.41 kg/m².    Assessment and Plan:    1. DM2  Pt presents to clinic doing well since last visit. He unfortunately has not been testing his BG d/t not obtaining CGM - states he plans to do so today.  Discussed Goals: FBG 80 - 130, 2hPP < 180, a1c < 7.0%   Most recent a1c drawn on 5/24/24 was 11%, which is not at goal.  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 He has a somewhat physically demanding job. Pt eats relatively carb heavy diet - he will work to modify.  Given no current BG readings, I will not titrate pt medications at this time. He is on optimized GLP1, SGLT2i, and metformin. Will titrate his insulin if further glycemic control is warranted.  DM rec score: 3 - LDL > 100 (ordered repeat prior to f/u given pt on statin, completed eye exam, will work to lower A1c.    - Medication changes:  None today    - Continue:  Current DM regimen     - Lifestyle changes:  Diet: Maximize lean proteins and veggies. Cut out/down on carbs. Avoid simple sugars.   Exercise: Increase as tolerated    Follow Up:  1 month for repeat A1c, review CMP &  lipids    Ander Dallas, PharmD, BCACP    CC:   Luis Antonio Cazares D.O.

## 2024-08-23 ENCOUNTER — HOSPITAL ENCOUNTER (OUTPATIENT)
Dept: LAB | Facility: MEDICAL CENTER | Age: 54
End: 2024-08-23
Payer: COMMERCIAL

## 2024-08-23 ENCOUNTER — PHARMACY VISIT (OUTPATIENT)
Dept: PHARMACY | Facility: MEDICAL CENTER | Age: 54
End: 2024-08-23
Payer: COMMERCIAL

## 2024-08-23 DIAGNOSIS — E11.65 TYPE 2 DIABETES MELLITUS WITH HYPERGLYCEMIA, WITH LONG-TERM CURRENT USE OF INSULIN (HCC): Chronic | ICD-10-CM

## 2024-08-23 DIAGNOSIS — Z79.4 TYPE 2 DIABETES MELLITUS WITH HYPERGLYCEMIA, WITH LONG-TERM CURRENT USE OF INSULIN (HCC): Chronic | ICD-10-CM

## 2024-08-23 LAB
ALBUMIN SERPL BCP-MCNC: 4.3 G/DL (ref 3.2–4.9)
ALBUMIN/GLOB SERPL: 1.2 G/DL
ALP SERPL-CCNC: 113 U/L (ref 30–99)
ALT SERPL-CCNC: 22 U/L (ref 2–50)
ANION GAP SERPL CALC-SCNC: 16 MMOL/L (ref 7–16)
AST SERPL-CCNC: 18 U/L (ref 12–45)
BILIRUB SERPL-MCNC: 0.8 MG/DL (ref 0.1–1.5)
BUN SERPL-MCNC: 19 MG/DL (ref 8–22)
CALCIUM ALBUM COR SERPL-MCNC: 9.3 MG/DL (ref 8.5–10.5)
CALCIUM SERPL-MCNC: 9.5 MG/DL (ref 8.5–10.5)
CHLORIDE SERPL-SCNC: 93 MMOL/L (ref 96–112)
CHOLEST SERPL-MCNC: 445 MG/DL (ref 100–199)
CO2 SERPL-SCNC: 23 MMOL/L (ref 20–33)
CREAT SERPL-MCNC: 1.15 MG/DL (ref 0.5–1.4)
GFR SERPLBLD CREATININE-BSD FMLA CKD-EPI: 76 ML/MIN/1.73 M 2
GLOBULIN SER CALC-MCNC: 3.5 G/DL (ref 1.9–3.5)
GLUCOSE SERPL-MCNC: 374 MG/DL (ref 65–99)
HDLC SERPL-MCNC: ABNORMAL MG/DL
LDLC SERPL CALC-MCNC: ABNORMAL MG/DL
POTASSIUM SERPL-SCNC: 4.2 MMOL/L (ref 3.6–5.5)
PROT SERPL-MCNC: 7.8 G/DL (ref 6–8.2)
SODIUM SERPL-SCNC: 132 MMOL/L (ref 135–145)
TRIGL SERPL-MCNC: 1752 MG/DL (ref 0–149)

## 2024-08-23 PROCEDURE — RXMED WILLOW AMBULATORY MEDICATION CHARGE: Performed by: FAMILY MEDICINE

## 2024-08-23 PROCEDURE — 36415 COLL VENOUS BLD VENIPUNCTURE: CPT

## 2024-08-23 PROCEDURE — 80053 COMPREHEN METABOLIC PANEL: CPT

## 2024-08-23 PROCEDURE — 80061 LIPID PANEL: CPT

## 2024-08-25 LAB — LDLC SERPL-MCNC: 40 MG/DL (ref 0–129)

## 2024-09-06 ENCOUNTER — NON-PROVIDER VISIT (OUTPATIENT)
Dept: VASCULAR LAB | Facility: MEDICAL CENTER | Age: 54
End: 2024-09-06
Attending: INTERNAL MEDICINE
Payer: COMMERCIAL

## 2024-09-06 VITALS — DIASTOLIC BLOOD PRESSURE: 83 MMHG | SYSTOLIC BLOOD PRESSURE: 120 MMHG | HEART RATE: 96 BPM

## 2024-09-06 DIAGNOSIS — E11.65 TYPE 2 DIABETES MELLITUS WITH HYPERGLYCEMIA, WITH LONG-TERM CURRENT USE OF INSULIN (HCC): ICD-10-CM

## 2024-09-06 DIAGNOSIS — Z79.4 TYPE 2 DIABETES MELLITUS WITH HYPERGLYCEMIA, WITH LONG-TERM CURRENT USE OF INSULIN (HCC): ICD-10-CM

## 2024-09-06 LAB
HBA1C MFR BLD: 8.9 % (ref ?–5.8)
POCT INT CON NEG: NEGATIVE
POCT INT CON POS: POSITIVE

## 2024-09-06 PROCEDURE — 83036 HEMOGLOBIN GLYCOSYLATED A1C: CPT

## 2024-09-06 RX ORDER — LANCETS 30 GAUGE
EACH MISCELLANEOUS
Qty: 100 EACH | Refills: 0 | Status: SHIPPED | OUTPATIENT
Start: 2024-09-06

## 2024-09-06 NOTE — PROGRESS NOTES
Patient Consult Note    Primary care physician: Luis Antonio Cazares D.O.    Reason for consult: Management of Uncontrolled Type 2 Diabetes    HPI:  Js Jiang is a 54 y.o. old patient who comes in today for evaluation of above stated problem.    Allergies  Patient has no allergy information on record.    Current Diabetes Medication Regimen  Metformin IR: 1000 mg BID   GLP-1 or GLP-1/GIP Agent: semaglutide (Ozempic) 2 mg weekly  SGLT-2 Inhibitor: empagliflozin (Jardiance) 25 mg daily    Basal Insulin: Lantus 34 units AM and 40 units PM     Previous Diabetes Medications and Reason for Discontinuation  None noted    Potential Barriers to Care:  Adherence: denies missed doses  Side effects: Patient reports persistent N/V. Unsure etiology or what medications may be contributing  Affordability: No issues, budgets for medications.     SMBG  Pt has home glucometer and proper testing technique - Ordered supplies during today's visit.     Pt reports blood sugars: Not currently monitoring     Hypoglycemia  Hypoglycemia awareness: No   Nocturnal hypoglycemia: unsure, may be contributing to vomiting   Hypoglycemia:   patient not monitoring   Pt's treatment of Hypoglycemia  Discussed 15:15 Rule    Lifestyle  Current Exercise - gym, weight lifting every other day     Dietary -   Breakfast -  boiled eggs, toast (white), milk, oatmeal, coffee with a bit of cream and splenda   Lunch - sandwich with turkey and ham from store, hamburger  Dinner - Not much  Snacks - bannana, doritos  Drinks - soda, sweet tea with honey     Marijuana use.    Labs  Lab Results   Component Value Date/Time    HBA1C 11.0 (H) 05/24/2024 08:50 AM    HBA1C 6.4 (H) 04/12/2019 11:37 AM      Lab Results   Component Value Date/Time    SODIUM 132 (L) 08/23/2024 01:50 PM    POTASSIUM 4.2 08/23/2024 01:50 PM    CHLORIDE 93 (L) 08/23/2024 01:50 PM    CO2 23 08/23/2024 01:50 PM    GLUCOSE 374 (H) 08/23/2024 01:50 PM    BUN 19 08/23/2024 01:50 PM    CREATININE  1.15 08/23/2024 01:50 PM     Lab Results   Component Value Date/Time    ALKPHOSPHAT 113 (H) 08/23/2024 01:50 PM    ASTSGOT 18 08/23/2024 01:50 PM    ALTSGPT 22 08/23/2024 01:50 PM    TBILIRUBIN 0.8 08/23/2024 01:50 PM    ALBUMIN 4.3 08/23/2024 01:50 PM      Lab Results   Component Value Date/Time    CHOLSTRLTOT 445 (H) 08/23/2024 01:50 PM    LDL see below 08/23/2024 01:50 PM    HDL see below 08/23/2024 01:50 PM    TRIGLYCERIDE 1752 (H) 08/23/2024 01:50 PM       Lab Results   Component Value Date/Time    MALBCRT 19 05/24/2024 08:49 AM    MICROALBUR 3.4 05/24/2024 08:49 AM       Physical Examination:  Vital signs: /83   Pulse 96  There is no height or weight on file to calculate BMI.    Assessment and Plan:    1. DM2  Patient presents to clinic for diabetes follow-up. Patient poor historian for confirming current medication regimen.   Discussed Goals: FBG 80 - 130, 2hPP < 180, a1c < 7.0%  Last a1c drawn on 09/06/2024 was 8.9%, which is not at goal and has improved since the previous reading  Patient complaining of vomiting daily, or every other. Unsure etiology. Patient unable to verify which medications if any where contributing the the nausea.  Patient not currently monitoring BG at home. Patient plans on obtaining monitor before next visit and maintaining a log for at least 2 weeks.   Not making any medication adjustments today, want home BG measurements to gauge further medication management. Patient will continue to focus on lifestyle changes.     - Medication changes:  No changes at today's visit     - Continue:  Metformin IR: 1000 mg BID   GLP-1 or GLP-1/GIP Agent: semaglutide (Ozempic) 2 mg weekly  SGLT-2 Inhibitor: empagliflozin (Jardiance) 25 mg daily     - Lifestyle changes:  Exercise Goal - Continue current gym regimen   Dietary Goal - Reduce carbohydrates    - Preventative management:  REC DM Score: 1, Score indicated at 2, recent LDL is 40 not captured in assessment.   Care gaps addressed:   A1c  is above 8%: Optimized DM medications/management  Care gaps updated in Health Maintenance    Follow Up:  4 weeks    Glory Schwarz, PharmD  Walter RodriguezD    CC:   Luis Antonio Cazares D.O.

## 2024-09-18 ENCOUNTER — OFFICE VISIT (OUTPATIENT)
Dept: MEDICAL GROUP | Facility: MEDICAL CENTER | Age: 54
End: 2024-09-18
Payer: COMMERCIAL

## 2024-09-18 VITALS
WEIGHT: 198 LBS | HEART RATE: 103 BPM | DIASTOLIC BLOOD PRESSURE: 64 MMHG | SYSTOLIC BLOOD PRESSURE: 122 MMHG | OXYGEN SATURATION: 96 % | BODY MASS INDEX: 28.35 KG/M2 | HEIGHT: 70 IN | TEMPERATURE: 98.4 F

## 2024-09-18 DIAGNOSIS — E78.5 HYPERLIPIDEMIA ASSOCIATED WITH TYPE 2 DIABETES MELLITUS (HCC): ICD-10-CM

## 2024-09-18 DIAGNOSIS — Z86.73 HISTORY OF STROKE: ICD-10-CM

## 2024-09-18 DIAGNOSIS — Z79.4 TYPE 2 DIABETES MELLITUS WITH HYPERGLYCEMIA, WITH LONG-TERM CURRENT USE OF INSULIN (HCC): Chronic | ICD-10-CM

## 2024-09-18 DIAGNOSIS — E11.65 TYPE 2 DIABETES MELLITUS WITH HYPERGLYCEMIA, WITH LONG-TERM CURRENT USE OF INSULIN (HCC): Chronic | ICD-10-CM

## 2024-09-18 DIAGNOSIS — E11.69 HYPERLIPIDEMIA ASSOCIATED WITH TYPE 2 DIABETES MELLITUS (HCC): ICD-10-CM

## 2024-09-18 PROCEDURE — 99214 OFFICE O/P EST MOD 30 MIN: CPT | Performed by: FAMILY MEDICINE

## 2024-09-18 PROCEDURE — 3078F DIAST BP <80 MM HG: CPT | Performed by: FAMILY MEDICINE

## 2024-09-18 PROCEDURE — 3074F SYST BP LT 130 MM HG: CPT | Performed by: FAMILY MEDICINE

## 2024-09-18 RX ORDER — ATORVASTATIN CALCIUM 80 MG/1
80 TABLET, FILM COATED ORAL NIGHTLY
Qty: 90 TABLET | Refills: 3 | Status: SHIPPED | OUTPATIENT
Start: 2024-09-18

## 2024-09-18 ASSESSMENT — FIBROSIS 4 INDEX: FIB4 SCORE: 0.82

## 2024-09-18 NOTE — PROGRESS NOTES
"Verbal consent was acquired by the patient to use [employer] ambient listening note generation during this visit    Subjective:     CC: \"chronic disease management\"    History of Present Illness  The patient presents for evaluation of multiple medical concerns.    He is currently on Ozempic, which he reports as beneficial. However, he experiences vomiting in the morning before work, a symptom that has been improving over time. He denies experiencing heartburn or abdominal pain. He is compliant with his medication regimen and has been making lifestyle changes such as regular exercise and healthier eating habits. He also reports weight loss.    He is taking fenofibrate and atorvastatin for cholesterol management, both of which he tolerates well. He reports no issues with his respiratory or cardiovascular systems.    SOCIAL HISTORY  He smokes weed.    FAMILY HISTORY  His mother had a stroke. His niece had a stroke and had a surgery on her heart.          Objective:     Exam:  /64   Pulse (!) 103   Temp 36.9 °C (98.4 °F) (Temporal)   Ht 1.778 m (5' 10\")   Wt 89.8 kg (198 lb)   SpO2 96%   BMI 28.41 kg/m²  Body mass index is 28.41 kg/m².    Physical Exam  Vitals reviewed.   Constitutional:       General: He is not in acute distress.     Appearance: Normal appearance.   HENT:      Head: Normocephalic and atraumatic.   Cardiovascular:      Rate and Rhythm: Normal rate and regular rhythm.      Heart sounds: Normal heart sounds.   Pulmonary:      Effort: Pulmonary effort is normal. No respiratory distress.      Breath sounds: Normal breath sounds.   Skin:     General: Skin is warm.   Neurological:      Mental Status: He is alert. Mental status is at baseline.   Psychiatric:         Mood and Affect: Mood normal.         Behavior: Behavior normal.      Comments: Speech stable but a bit broken             Results  Laboratory Studies  A1c is down to 8.9 from 11 three months ago. Sodium levels have slightly dropped. " Total cholesterol is high. Triglycerides have slightly decreased.        Assessment & Plan:       1. Type 2 diabetes mellitus with hyperglycemia, with long-term current use of insulin (HCC)  - atorvastatin (LIPITOR) 80 MG tablet; Take 1 Tablet by mouth every evening.  Dispense: 90 Tablet; Refill: 3    2. Hyperlipidemia associated with type 2 diabetes mellitus (HCC)  - atorvastatin (LIPITOR) 80 MG tablet; Take 1 Tablet by mouth every evening.  Dispense: 90 Tablet; Refill: 3    3. History of stroke  - atorvastatin (LIPITOR) 80 MG tablet; Take 1 Tablet by mouth every evening.  Dispense: 90 Tablet; Refill: 3      Assessment & Plan  1. Diabetes Mellitus.  There has been an improvement in his A1c levels, decreasing from 11 three months ago to 8.9 currently. The target is to achieve an A1c level below 8. He was advised to maintain a healthy diet and continue with his current medication regimen. He is experiencing nausea and vomiting, likely due to Ozempic, which can slow gastric emptying. He was advised to eat smaller meals and stay hydrated. He will continue to monitor his blood glucose levels daily. Insulin and test strips will be picked up from [facility] after his payday on Friday.    2. Hypercholesterolemia.  Despite a slight decrease in triglycerides, his total cholesterol remains elevated. The dosage of atorvastatin will be increased to 80 mg. He was instructed to take two tablets of his current prescription until it is depleted, after which he can collect the new prescription from the pharmacy downstairs.    Follow-up  The patient will follow up in 3 months.         Return in about 3 months (around 12/18/2024), or if symptoms worsen or fail to improve, for Diabetes F/U.      This note was created using voice recognition software (Dragon). The accuracy of the dictation is limited by the abilities of the software. I have reviewed the note prior to signing, however some errors in grammar and context are still possible.  If you have any questions related to this note please do not hesitate to contact our office.

## 2024-10-04 ENCOUNTER — NON-PROVIDER VISIT (OUTPATIENT)
Dept: VASCULAR LAB | Facility: MEDICAL CENTER | Age: 54
End: 2024-10-04
Attending: INTERNAL MEDICINE
Payer: COMMERCIAL

## 2024-10-04 PROCEDURE — 99212 OFFICE O/P EST SF 10 MIN: CPT

## 2024-10-07 ENCOUNTER — TELEPHONE (OUTPATIENT)
Dept: VASCULAR LAB | Facility: MEDICAL CENTER | Age: 54
End: 2024-10-07
Payer: COMMERCIAL

## 2024-10-09 ENCOUNTER — TELEPHONE (OUTPATIENT)
Dept: VASCULAR LAB | Facility: MEDICAL CENTER | Age: 54
End: 2024-10-09
Payer: COMMERCIAL

## 2024-10-21 ENCOUNTER — TELEPHONE (OUTPATIENT)
Dept: VASCULAR LAB | Facility: MEDICAL CENTER | Age: 54
End: 2024-10-21
Payer: COMMERCIAL

## 2024-12-18 ENCOUNTER — APPOINTMENT (OUTPATIENT)
Dept: MEDICAL GROUP | Facility: MEDICAL CENTER | Age: 54
End: 2024-12-18
Payer: COMMERCIAL